# Patient Record
Sex: FEMALE | Race: WHITE | NOT HISPANIC OR LATINO | Employment: FULL TIME | URBAN - METROPOLITAN AREA
[De-identification: names, ages, dates, MRNs, and addresses within clinical notes are randomized per-mention and may not be internally consistent; named-entity substitution may affect disease eponyms.]

---

## 2024-07-31 ENCOUNTER — LAB REQUISITION (OUTPATIENT)
Dept: LAB | Facility: HOSPITAL | Age: 29
End: 2024-07-31
Payer: COMMERCIAL

## 2024-07-31 DIAGNOSIS — R39.9 UNSPECIFIED SYMPTOMS AND SIGNS INVOLVING THE GENITOURINARY SYSTEM: ICD-10-CM

## 2024-07-31 PROCEDURE — 88175 CYTOPATH C/V AUTO FLUID REDO: CPT

## 2024-07-31 PROCEDURE — 87086 URINE CULTURE/COLONY COUNT: CPT

## 2024-08-01 ENCOUNTER — APPOINTMENT (OUTPATIENT)
Dept: RADIOLOGY | Facility: HOSPITAL | Age: 29
End: 2024-08-01
Payer: COMMERCIAL

## 2024-08-01 ENCOUNTER — LAB REQUISITION (OUTPATIENT)
Dept: LAB | Facility: HOSPITAL | Age: 29
End: 2024-08-01
Payer: COMMERCIAL

## 2024-08-01 ENCOUNTER — HOSPITAL ENCOUNTER (EMERGENCY)
Facility: HOSPITAL | Age: 29
Discharge: HOME | End: 2024-08-01
Payer: COMMERCIAL

## 2024-08-01 VITALS
RESPIRATION RATE: 18 BRPM | OXYGEN SATURATION: 99 % | HEIGHT: 69 IN | TEMPERATURE: 98.2 F | BODY MASS INDEX: 24.52 KG/M2 | HEART RATE: 101 BPM | SYSTOLIC BLOOD PRESSURE: 171 MMHG | WEIGHT: 165.57 LBS | DIASTOLIC BLOOD PRESSURE: 77 MMHG

## 2024-08-01 DIAGNOSIS — Z01.419 ENCOUNTER FOR GYNECOLOGICAL EXAMINATION (GENERAL) (ROUTINE) WITHOUT ABNORMAL FINDINGS: ICD-10-CM

## 2024-08-01 DIAGNOSIS — Z11.51 ENCOUNTER FOR SCREENING FOR HUMAN PAPILLOMAVIRUS (HPV): ICD-10-CM

## 2024-08-01 DIAGNOSIS — R10.2 PELVIC PAIN: Primary | ICD-10-CM

## 2024-08-01 LAB
APPEARANCE UR: CLEAR
BACTERIA #/AREA URNS AUTO: ABNORMAL /HPF
BILIRUB UR STRIP.AUTO-MCNC: NEGATIVE MG/DL
COLOR UR: COLORLESS
GLUCOSE UR STRIP.AUTO-MCNC: NORMAL MG/DL
HCG UR QL IA.RAPID: NEGATIVE
KETONES UR STRIP.AUTO-MCNC: NEGATIVE MG/DL
LEUKOCYTE ESTERASE UR QL STRIP.AUTO: NEGATIVE
NITRITE UR QL STRIP.AUTO: NEGATIVE
PH UR STRIP.AUTO: 5.5 [PH]
PROT UR STRIP.AUTO-MCNC: NEGATIVE MG/DL
RBC # UR STRIP.AUTO: ABNORMAL /UL
RBC #/AREA URNS AUTO: ABNORMAL /HPF
SP GR UR STRIP.AUTO: 1.01
UROBILINOGEN UR STRIP.AUTO-MCNC: NORMAL MG/DL
WBC #/AREA URNS AUTO: ABNORMAL /HPF

## 2024-08-01 PROCEDURE — 76856 US EXAM PELVIC COMPLETE: CPT | Performed by: RADIOLOGY

## 2024-08-01 PROCEDURE — 96372 THER/PROPH/DIAG INJ SC/IM: CPT | Performed by: NURSE PRACTITIONER

## 2024-08-01 PROCEDURE — 2500000001 HC RX 250 WO HCPCS SELF ADMINISTERED DRUGS (ALT 637 FOR MEDICARE OP): Performed by: NURSE PRACTITIONER

## 2024-08-01 PROCEDURE — 81025 URINE PREGNANCY TEST: CPT | Performed by: NURSE PRACTITIONER

## 2024-08-01 PROCEDURE — 81003 URINALYSIS AUTO W/O SCOPE: CPT | Performed by: NURSE PRACTITIONER

## 2024-08-01 PROCEDURE — 76830 TRANSVAGINAL US NON-OB: CPT | Performed by: RADIOLOGY

## 2024-08-01 PROCEDURE — 99284 EMERGENCY DEPT VISIT MOD MDM: CPT | Mod: 25

## 2024-08-01 PROCEDURE — 2500000004 HC RX 250 GENERAL PHARMACY W/ HCPCS (ALT 636 FOR OP/ED): Performed by: NURSE PRACTITIONER

## 2024-08-01 PROCEDURE — 76856 US EXAM PELVIC COMPLETE: CPT

## 2024-08-01 RX ORDER — OXYCODONE AND ACETAMINOPHEN 5; 325 MG/1; MG/1
1 TABLET ORAL ONCE
Status: COMPLETED | OUTPATIENT
Start: 2024-08-01 | End: 2024-08-01

## 2024-08-01 RX ORDER — OXYCODONE AND ACETAMINOPHEN 5; 325 MG/1; MG/1
1 TABLET ORAL EVERY 6 HOURS PRN
Qty: 8 TABLET | Refills: 0 | Status: SHIPPED | OUTPATIENT
Start: 2024-08-01 | End: 2024-08-03

## 2024-08-01 RX ORDER — METHOCARBAMOL 500 MG/1
500 TABLET, FILM COATED ORAL 3 TIMES DAILY
Qty: 15 TABLET | Refills: 0 | Status: SHIPPED | OUTPATIENT
Start: 2024-08-01

## 2024-08-01 RX ORDER — KETOROLAC TROMETHAMINE 30 MG/ML
30 INJECTION, SOLUTION INTRAMUSCULAR; INTRAVENOUS ONCE
Status: COMPLETED | OUTPATIENT
Start: 2024-08-01 | End: 2024-08-01

## 2024-08-01 RX ORDER — METHOCARBAMOL 500 MG/1
500 TABLET, FILM COATED ORAL ONCE
Status: COMPLETED | OUTPATIENT
Start: 2024-08-01 | End: 2024-08-01

## 2024-08-01 ASSESSMENT — PAIN DESCRIPTION - PAIN TYPE: TYPE: ACUTE PAIN

## 2024-08-01 ASSESSMENT — PAIN SCALES - GENERAL
PAINLEVEL_OUTOF10: 10 - WORST POSSIBLE PAIN
PAINLEVEL_OUTOF10: 9
PAINLEVEL_OUTOF10: 10 - WORST POSSIBLE PAIN
PAINLEVEL_OUTOF10: 10 - WORST POSSIBLE PAIN

## 2024-08-01 ASSESSMENT — PAIN - FUNCTIONAL ASSESSMENT
PAIN_FUNCTIONAL_ASSESSMENT: 0-10
PAIN_FUNCTIONAL_ASSESSMENT: 0-10

## 2024-08-01 ASSESSMENT — PAIN DESCRIPTION - ORIENTATION: ORIENTATION: RIGHT;LEFT;LOWER

## 2024-08-01 ASSESSMENT — COLUMBIA-SUICIDE SEVERITY RATING SCALE - C-SSRS
6. HAVE YOU EVER DONE ANYTHING, STARTED TO DO ANYTHING, OR PREPARED TO DO ANYTHING TO END YOUR LIFE?: NO
2. HAVE YOU ACTUALLY HAD ANY THOUGHTS OF KILLING YOURSELF?: NO
1. IN THE PAST MONTH, HAVE YOU WISHED YOU WERE DEAD OR WISHED YOU COULD GO TO SLEEP AND NOT WAKE UP?: NO

## 2024-08-01 ASSESSMENT — PAIN DESCRIPTION - FREQUENCY: FREQUENCY: CONSTANT/CONTINUOUS

## 2024-08-01 ASSESSMENT — PAIN DESCRIPTION - LOCATION: LOCATION: ABDOMEN

## 2024-08-01 ASSESSMENT — PAIN DESCRIPTION - DESCRIPTORS
DESCRIPTORS: SHARP;DULL
DESCRIPTORS: PENETRATING

## 2024-08-01 ASSESSMENT — PAIN DESCRIPTION - PROGRESSION: CLINICAL_PROGRESSION: NOT CHANGED

## 2024-08-01 ASSESSMENT — PAIN DESCRIPTION - ONSET: ONSET: SUDDEN

## 2024-08-01 NOTE — ED PROVIDER NOTES
HPI   Chief Complaint   Patient presents with    Hip Pain     Since 1000 yesterday after my pelvic exam I have had sharp dull pain with spotting the pain goes into my lower back        HPI  See my MDM      Patient History   No past medical history on file.  No past surgical history on file.  No family history on file.  Social History     Tobacco Use    Smoking status: Not on file    Smokeless tobacco: Not on file   Substance Use Topics    Alcohol use: Not on file    Drug use: Not on file       Physical Exam   ED Triage Vitals [08/01/24 1706]   Temperature Heart Rate Respirations BP   36.8 °C (98.2 °F) (!) 101 18 171/77      Pulse Ox Temp Source Heart Rate Source Patient Position   99 % Oral Monitor Sitting      BP Location FiO2 (%)     Left arm --       Physical Exam  CONSTITUTIONAL: Vital signs reviewed as charted, well-developed and in no distress  Eyes: Extraocular muscles are intact. Pupils equal round and reactive to light. Conjunctiva are pink.    ENT: Mucous membranes are moist. Tongue in the midline. Pharynx was without erythema or exudates, uvula midline  LUNGS: Breath sounds equal and clear to auscultation. Good air exchange, no wheezes rales or retractions, pulse oximetry is charted.  HEART: Regular rate and rhythm without murmur thrill or rub, strong tones, auscultation is normal.  ABDOMEN: Soft and nontender without guarding rebound rigidity or mass. Bowel sounds are present and normal in all quadrants. There is no palpable masses or aneurysms identified. No hepatosplenomegaly, normal abdominal exam.  Neuro: The patient is awake, alert and oriented ×3. Moving all 4 extremities and answering questions appropriately.   MUSCULOSKELETAL: The calves are nontender to palpation. Full gross active range of motion.   PSYCH: Awake alert oriented, normal mood and affect.  Skin:  Dry, normal color, warm to the touch, no rash present.        ED Course & MDM   Diagnoses as of 08/01/24 1910   Pelvic pain              "          [place] Coma Scale Score: 15                        Medical Decision Making  History obtained from: patient    Vital signs, nursing notes, current medications, past medical history, Surgical history, allergies, social history, family History were reviewed.         HPI:  Patient 28-year-old female history of PID present emergency room today complaining of pelvic pain.  States she been having chronic pain for the last 5 or 6 months saw a new GYN yesterday had a pelvic exam with Pap and has been having excruciating sharp pains ever since.  States has been having some spotting as well.  States she called GYN and they told her to come here to the ER.  She states she has tried Excedrin.  Does not take anti-inflammatories as a mess with her stomach.  Denies dizziness, chest pain, shortness of breath or lower extremity edema.  Will obtain ultrasound, patient is asked not to do the transvaginal portion.      10 point ROS was reviewed and negative except Noted above in HPI.  DDX: as listed above          MDM Summary/considerations:  EMERGENCY DEPARTMENT COURSE and DIFFERENTIAL DIAGNOSIS/MDM:    The patient presented with a chief complaint of pelvic pain. The differential diagnosis associated with this patient's presentation includes PID, dysmenorrhea, musculoskeletal pain.     Vitals:    Vitals:    08/01/24 1706 08/01/24 1712   BP: 171/77    BP Location: Left arm    Patient Position: Sitting    Pulse: (!) 101    Resp: 18    Temp: 36.8 °C (98.2 °F)    TempSrc: Oral    SpO2: 99% 99%   Weight: 75.1 kg (165 lb 9.1 oz)    Height: 1.753 m (5' 9\")        Diagnoses as of 08/01/24 1910   Pelvic pain       History Limited by:    None    Independent history obtained from:    None    External records reviewed:    None    Diagnostics interpreted by me:    Ultrasound(s) pelvis    Discussions with other clinicians:    None    Chronic conditions impacting care:    None    Social determinants of health affecting " care:    None    Diagnostic tests considered but not performed: none    ED Medications managed:    Medications   ketorolac (Toradol) injection 30 mg (30 mg intramuscular Given 8/1/24 1729)   oxyCODONE-acetaminophen (Percocet) 5-325 mg per tablet 1 tablet (1 tablet oral Given 8/1/24 1846)   methocarbamol (Robaxin) tablet 500 mg (500 mg oral Given 8/1/24 1909)       Prescription drugs considered:     Oxycodone, Robaxin    Screenings:          Labs Reviewed   URINALYSIS WITH REFLEX CULTURE AND MICROSCOPIC - Abnormal       Result Value    Color, Urine Colorless (*)     Appearance, Urine Clear      Specific Gravity, Urine 1.006      pH, Urine 5.5      Protein, Urine NEGATIVE      Glucose, Urine Normal      Blood, Urine 0.03 (TRACE) (*)     Ketones, Urine NEGATIVE      Bilirubin, Urine NEGATIVE      Urobilinogen, Urine Normal      Nitrite, Urine NEGATIVE      Leukocyte Esterase, Urine NEGATIVE     URINALYSIS MICROSCOPIC WITH REFLEX CULTURE - Abnormal    WBC, Urine 1-5      RBC, Urine NONE      Bacteria, Urine 1+ (*)    HCG, URINE, QUALITATIVE - Normal    HCG, Urine NEGATIVE     URINALYSIS WITH REFLEX CULTURE AND MICROSCOPIC    Narrative:     The following orders were created for panel order Urinalysis with Reflex Culture and Microscopic.  Procedure                               Abnormality         Status                     ---------                               -----------         ------                     Urinalysis with Reflex C...[[id number]]  Abnormal            Final result               Extra Urine Gray Tube[[id number]]                                                         Please view results for these tests on the individual orders.   EXTRA URINE GRAY TUBE     US PELVIS TRANSABDOMINAL WITH TRANSVAGINAL   Final Result   Limited examination secondary to shadowing from overlying bowel gas.        1.6 cm uterine fibroid.        Limited visualization of the right ovary and nonvisualization of the   left ovary  secondary to shadowing from overlying bowel gas.        No significant free pelvic fluid.        Interpretation of the examination assumes negative pregnancy test;   please clinically correlate.        MACRO:   None        Signed by: Alfredito Barr 8/1/2024 6:36 PM   Dictation workstation:   [id number]        Medications   ketorolac (Toradol) injection 30 mg (30 mg intramuscular Given 8/1/24 1729)   oxyCODONE-acetaminophen (Percocet) 5-325 mg per tablet 1 tablet (1 tablet oral Given 8/1/24 1846)   methocarbamol (Robaxin) tablet 500 mg (500 mg oral Given 8/1/24 1909)     New Prescriptions    METHOCARBAMOL (ROBAXIN) 500 MG TABLET    Take 1 tablet (500 mg) by mouth 3 times a day.    OXYCODONE-ACETAMINOPHEN (PERCOCET) 5-325 MG TABLET    Take 1 tablet by mouth every 6 hours if needed for severe pain (7 - 10) for up to 2 days.     I estimate there is a low risk for acute appendicitis, polyps correction, cholecystitis, diverticulitis, incarcerated hernia, mesenteric ischemia, pancreatitis, perforated bowel or ulcer, ectopic pregnancy, or ovarian abscess, thus I consider the discharge disposition reasonable. Also there is no evidence of peritonitis, sepsis, or toxicity. We have discussed the diagnosis and risks, and we agree with discharging home to follow-up with her primary care doctor. We have discussed returning to the emergency department immediately if new or worsening symptoms occur. We have discussed the symptoms which are most concerning such as bloody stool, fever, changing or worsening pain, or vomiting necessitates immediate return.    Patient is feeling a little better after medications here in the ED urinalysis grossly unremarkable ultrasound shows small uterine fibroid, there is overlying bowel gas.  The right ovary is limited in the left is not visualized.  Patient with negative pregnancy test.  Will follow-up with her OB tomorrow for reevaluation.  Discharged home stable condition.    All of the patient's  questions were answered to the best of my ability.  Patient states understanding that they have been screened for an emergency today and we have not found any etiology of symptoms that requires emergent treatment or admission to the hospital at this point. They understand that they have not had definitive care day and require follow-up for treatment of their condition. They also state understanding that they may have an emergent condition that may potentially have not of detected at this visit and they must return to the emergency department if they develop any worsening of symptoms or new complaints.      Discussed H&P with supervising physician, aware of results and agrees with plan/ disposition.        Critical Care: Not warranted at this time        This chart was completed using voice recognition transcription software. Please excuse any errors of transcription including grammatical, punctuation, syntax and spelling errors.  Please contact me with any questions regarding this chart.    Procedure  Procedures     JONNY Tucker-CNP  08/01/24 1910

## 2024-08-02 LAB — BACTERIA UR CULT: NO GROWTH

## 2024-08-13 ENCOUNTER — APPOINTMENT (OUTPATIENT)
Dept: PREADMISSION TESTING | Facility: HOSPITAL | Age: 29
End: 2024-08-13
Payer: COMMERCIAL

## 2024-08-14 LAB
CYTOLOGY CMNT CVX/VAG CYTO-IMP: NORMAL
LAB AP HPV GENOTYPE QUESTION: YES
LAB AP HPV HR: NORMAL
LABORATORY COMMENT REPORT: NORMAL
LMP START DATE: NORMAL
PATH REPORT.TOTAL CANCER: NORMAL

## 2024-08-16 ENCOUNTER — LAB (OUTPATIENT)
Dept: LAB | Facility: LAB | Age: 29
End: 2024-08-16
Payer: COMMERCIAL

## 2024-08-16 ENCOUNTER — PRE-ADMISSION TESTING (OUTPATIENT)
Dept: PREADMISSION TESTING | Facility: HOSPITAL | Age: 29
End: 2024-08-16
Payer: COMMERCIAL

## 2024-08-16 VITALS
WEIGHT: 165 LBS | DIASTOLIC BLOOD PRESSURE: 74 MMHG | OXYGEN SATURATION: 100 % | HEIGHT: 69 IN | HEART RATE: 74 BPM | TEMPERATURE: 98.8 F | BODY MASS INDEX: 24.44 KG/M2 | SYSTOLIC BLOOD PRESSURE: 137 MMHG | RESPIRATION RATE: 16 BRPM

## 2024-08-16 DIAGNOSIS — Z01.818 PREOP TESTING: Primary | ICD-10-CM

## 2024-08-16 DIAGNOSIS — Z01.818 PREOP TESTING: ICD-10-CM

## 2024-08-16 LAB
APTT PPP: 28 SECONDS (ref 27–38)
INR PPP: 1.3 (ref 0.9–1.1)
PROTHROMBIN TIME: 14.2 SECONDS (ref 9.8–12.8)

## 2024-08-16 PROCEDURE — 36415 COLL VENOUS BLD VENIPUNCTURE: CPT

## 2024-08-16 PROCEDURE — 85610 PROTHROMBIN TIME: CPT

## 2024-08-16 PROCEDURE — 85730 THROMBOPLASTIN TIME PARTIAL: CPT

## 2024-08-16 RX ORDER — GABAPENTIN 100 MG/1
1 CAPSULE ORAL
COMMUNITY
Start: 2024-08-05

## 2024-08-16 RX ORDER — ALBUTEROL SULFATE 90 UG/1
2 INHALANT RESPIRATORY (INHALATION)
COMMUNITY
Start: 2024-01-31 | End: 2025-01-30

## 2024-08-16 RX ORDER — NORETHINDRONE ACETATE AND ETHINYL ESTRADIOL, ETHINYL ESTRADIOL AND FERROUS FUMARATE 1MG-10(24)
1 KIT ORAL
COMMUNITY
Start: 2024-07-31

## 2024-08-16 RX ORDER — ONDANSETRON 4 MG/1
TABLET, FILM COATED ORAL
COMMUNITY
Start: 2024-08-05

## 2024-08-16 ASSESSMENT — ENCOUNTER SYMPTOMS
HEMATOLOGIC/LYMPHATIC NEGATIVE: 1
CONSTITUTIONAL NEGATIVE: 1
EYES NEGATIVE: 1
ALLERGIC/IMMUNOLOGIC NEGATIVE: 1
ENDOCRINE NEGATIVE: 1
CARDIOVASCULAR NEGATIVE: 1
RESPIRATORY NEGATIVE: 1
MUSCULOSKELETAL NEGATIVE: 1
GASTROINTESTINAL NEGATIVE: 1
PSYCHIATRIC NEGATIVE: 1
NEUROLOGICAL NEGATIVE: 1

## 2024-08-16 ASSESSMENT — DUKE ACTIVITY SCORE INDEX (DASI)
CAN YOU CLIMB A FLIGHT OF STAIRS OR WALK UP A HILL: YES
TOTAL_SCORE: 58.2
CAN YOU WALK INDOORS, SUCH AS AROUND YOUR HOUSE: YES
CAN YOU DO MODERATE WORK AROUND THE HOUSE LIKE VACUUMING, SWEEPING FLOORS OR CARRYING GROCERIES: YES
CAN YOU PARTICIPATE IN STRENOUS SPORTS LIKE SWIMMING, SINGLES TENNIS, FOOTBALL, BASKETBALL, OR SKIING: YES
DASI METS SCORE: 9.9
CAN YOU WALK A BLOCK OR TWO ON LEVEL GROUND: YES
CAN YOU PARTICIPATE IN MODERATE RECREATIONAL ACTIVITIES LIKE GOLF, BOWLING, DANCING, DOUBLES TENNIS OR THROWING A BASEBALL OR FOOTBALL: YES
CAN YOU HAVE SEXUAL RELATIONS: YES
CAN YOU DO YARD WORK LIKE RAKING LEAVES, WEEDING OR PUSHING A MOWER: YES
CAN YOU DO HEAVY WORK AROUND THE HOUSE LIKE SCRUBBING FLOORS OR LIFTING AND MOVING HEAVY FURNITURE: YES
CAN YOU TAKE CARE OF YOURSELF (EAT, DRESS, BATHE, OR USE TOILET): YES
CAN YOU DO LIGHT WORK AROUND THE HOUSE LIKE DUSTING OR WASHING DISHES: YES
CAN YOU RUN A SHORT DISTANCE: YES

## 2024-08-16 NOTE — CPM/PAT H&P
CPM/PAT Evaluation       Name: Alisia Rodriguez (Alisia Rodriguez)  /Age: 1995/28 y.o.     In-Person       Chief Complaint: pelvic pain     HPI    Pt is a 28 year old female with pelvic pain. Pt reports about 5 months ago she developed sudden severe pelvic pain. Pt describes the pelvic pain as a constant severe pain that is decreasing her quality of life. Pt has had ultrasounds of her pelvis and a MRI of her pelvis. Per pt her OBGYN recommended a laparoscopy to find out more information about why she is having pain. Pt has been scheduled for a diagnostic laparoscopy. Pt denies CP, SOB, or dizziness.     Past Medical History:   Diagnosis Date    Asthma ([facility])     Factor VII deficiency (Multi)     GERD (gastroesophageal reflux disease)     IBS (irritable bowel syndrome)     Migraines     Ovarian cyst     right ovarian cyst    Pelvic pain     PONV (postoperative nausea and vomiting)     PUD (peptic ulcer disease)     Uterine fibroid      Past Surgical History:   Procedure Laterality Date    ADENOIDECTOMY      TONSILLECTOMY       Social History     Tobacco Use    Smoking status: Never    Smokeless tobacco: Never   Substance Use Topics    Alcohol use: Never     Social History     Substance and Sexual Activity   Drug Use Never     Allergies   Allergen Reactions    Augmentin [Amoxicillin-Pot Clavulanate] Hives     Current Outpatient Medications   Medication Sig Dispense Refill    albuterol 90 mcg/actuation inhaler Inhale 2 puffs.      gabapentin (Neurontin) 100 mg capsule Take 1 capsule (100 mg) by mouth 3 times a day.      Lo Loestrin Fe 1 mg-10 mcg (24)/10 mcg (2) tablet Take 1 tablet by mouth early in the morning..      ondansetron (Zofran) 4 mg tablet 1 tablet Orally every 8 hours PRN pain for 30 day(s)      aspirin-acetaminophen-caffeine (Excedrin Migraine) 250-250-65 mg tablet Take 1 tablet by mouth every 6 hours if needed for headaches.      methocarbamol (Robaxin) 500 mg tablet Take 1 tablet (500 mg)  "by mouth 3 times a day. 15 tablet 0     No current facility-administered medications for this visit.     Review of Systems   Constitutional: Negative.    HENT: Negative.     Eyes: Negative.    Respiratory: Negative.     Cardiovascular: Negative.    Gastrointestinal: Negative.    Endocrine: Negative.    Genitourinary:  Positive for pelvic pain.   Musculoskeletal: Negative.    Skin: Negative.    Allergic/Immunologic: Negative.    Neurological: Negative.    Hematological: Negative.    Psychiatric/Behavioral: Negative.       /74   Pulse 74   Temp 37.1 °C (98.8 °F) (Temporal)   Resp 16   Ht 1.753 m (5' 9\")   Wt 74.8 kg (165 lb)   LMP 07/04/2024   SpO2 100%   BMI 24.37 kg/m²     Physical Exam  Vitals reviewed.   Constitutional:       Appearance: Normal appearance. She is normal weight.   HENT:      Head: Normocephalic and atraumatic.      Nose: Nose normal.      Mouth/Throat:      Mouth: Mucous membranes are moist.      Pharynx: Oropharynx is clear.   Eyes:      Extraocular Movements: Extraocular movements intact.      Conjunctiva/sclera: Conjunctivae normal.      Pupils: Pupils are equal, round, and reactive to light.   Cardiovascular:      Rate and Rhythm: Normal rate and regular rhythm.      Pulses: Normal pulses.      Heart sounds: Normal heart sounds.   Pulmonary:      Effort: Pulmonary effort is normal.      Breath sounds: Normal breath sounds.   Abdominal:      General: Bowel sounds are normal.      Palpations: Abdomen is soft.      Tenderness: There is abdominal tenderness (abdominal pain with palpation).   Musculoskeletal:         General: Normal range of motion.      Cervical back: Normal range of motion and neck supple.   Skin:     General: Skin is warm and dry.   Neurological:      General: No focal deficit present.      Mental Status: She is alert and oriented to person, place, and time. Mental status is at baseline.   Psychiatric:         Mood and Affect: Mood normal.         Behavior: Behavior " "normal.         Thought Content: Thought content normal.         Judgment: Judgment normal.        PAT AIRWAY:   Airway:     Mallampati::  II    TM distance::  >3 FB    Neck ROM::  Full  normal      ASA: 2  DASI: 58.2  METS: 9.9  CHADS: 1.9%  RCRI: 0.4%  STOP BAN    Assessment and Plan:     Pelvic pain in female: diagnostic laparoscopy  Asthma: albuterol use with illness.   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 Pt stated \"my blood takes longer to clot\".   IBS      CBC and CMP completed on 2024.  Coag screen collected in PAT.     Manda Cuevas, APRN-CNP      "

## 2024-08-16 NOTE — H&P (VIEW-ONLY)
CPM/PAT Evaluation       Name: Alisia Rodriguez (Alisia Rodriguez)  /Age: 1995/28 y.o.     In-Person       Chief Complaint: pelvic pain     HPI    Pt is a 28 year old female with pelvic pain. Pt reports about 5 months ago she developed sudden severe pelvic pain. Pt describes the pelvic pain as a constant severe pain that is decreasing her quality of life. Pt has had ultrasounds of her pelvis and a MRI of her pelvis. Per pt her OBGYN recommended a laparoscopy to find out more information about why she is having pain. Pt has been scheduled for a diagnostic laparoscopy. Pt denies CP, SOB, or dizziness.     Past Medical History:   Diagnosis Date    Asthma ([facility])     Factor VII deficiency (Multi)     GERD (gastroesophageal reflux disease)     IBS (irritable bowel syndrome)     Migraines     Ovarian cyst     right ovarian cyst    Pelvic pain     PONV (postoperative nausea and vomiting)     PUD (peptic ulcer disease)     Uterine fibroid      Past Surgical History:   Procedure Laterality Date    ADENOIDECTOMY      TONSILLECTOMY       Social History     Tobacco Use    Smoking status: Never    Smokeless tobacco: Never   Substance Use Topics    Alcohol use: Never     Social History     Substance and Sexual Activity   Drug Use Never     Allergies   Allergen Reactions    Augmentin [Amoxicillin-Pot Clavulanate] Hives     Current Outpatient Medications   Medication Sig Dispense Refill    albuterol 90 mcg/actuation inhaler Inhale 2 puffs.      gabapentin (Neurontin) 100 mg capsule Take 1 capsule (100 mg) by mouth 3 times a day.      Lo Loestrin Fe 1 mg-10 mcg (24)/10 mcg (2) tablet Take 1 tablet by mouth early in the morning..      ondansetron (Zofran) 4 mg tablet 1 tablet Orally every 8 hours PRN pain for 30 day(s)      aspirin-acetaminophen-caffeine (Excedrin Migraine) 250-250-65 mg tablet Take 1 tablet by mouth every 6 hours if needed for headaches.      methocarbamol (Robaxin) 500 mg tablet Take 1 tablet (500 mg)  "by mouth 3 times a day. 15 tablet 0     No current facility-administered medications for this visit.     Review of Systems   Constitutional: Negative.    HENT: Negative.     Eyes: Negative.    Respiratory: Negative.     Cardiovascular: Negative.    Gastrointestinal: Negative.    Endocrine: Negative.    Genitourinary:  Positive for pelvic pain.   Musculoskeletal: Negative.    Skin: Negative.    Allergic/Immunologic: Negative.    Neurological: Negative.    Hematological: Negative.    Psychiatric/Behavioral: Negative.       /74   Pulse 74   Temp 37.1 °C (98.8 °F) (Temporal)   Resp 16   Ht 1.753 m (5' 9\")   Wt 74.8 kg (165 lb)   LMP 07/04/2024   SpO2 100%   BMI 24.37 kg/m²     Physical Exam  Vitals reviewed.   Constitutional:       Appearance: Normal appearance. She is normal weight.   HENT:      Head: Normocephalic and atraumatic.      Nose: Nose normal.      Mouth/Throat:      Mouth: Mucous membranes are moist.      Pharynx: Oropharynx is clear.   Eyes:      Extraocular Movements: Extraocular movements intact.      Conjunctiva/sclera: Conjunctivae normal.      Pupils: Pupils are equal, round, and reactive to light.   Cardiovascular:      Rate and Rhythm: Normal rate and regular rhythm.      Pulses: Normal pulses.      Heart sounds: Normal heart sounds.   Pulmonary:      Effort: Pulmonary effort is normal.      Breath sounds: Normal breath sounds.   Abdominal:      General: Bowel sounds are normal.      Palpations: Abdomen is soft.      Tenderness: There is abdominal tenderness (abdominal pain with palpation).   Musculoskeletal:         General: Normal range of motion.      Cervical back: Normal range of motion and neck supple.   Skin:     General: Skin is warm and dry.   Neurological:      General: No focal deficit present.      Mental Status: She is alert and oriented to person, place, and time. Mental status is at baseline.   Psychiatric:         Mood and Affect: Mood normal.         Behavior: Behavior " "normal.         Thought Content: Thought content normal.         Judgment: Judgment normal.        PAT AIRWAY:   Airway:     Mallampati::  II    TM distance::  >3 FB    Neck ROM::  Full  normal      ASA: 2  DASI: 58.2  METS: 9.9  CHADS: 1.9%  RCRI: 0.4%  STOP BAN    Assessment and Plan:     Pelvic pain in female: diagnostic laparoscopy  Asthma: albuterol use with illness.   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 Pt stated \"my blood takes longer to clot\".   IBS      CBC and CMP completed on 2024.  Coag screen collected in PAT.     Manda Cuevas, APRN-CNP      "

## 2024-08-16 NOTE — PREPROCEDURE INSTRUCTIONS
Preoperative Fasting Guidelines    Why must I stop eating and drinking near surgery time?  With sedation, food or liquid in your stomach can enter your lungs causing serious complications  Increases nausea and vomiting    When do I need to stop eating and drinking before my surgery?  Do not eat any food after midnight the night before your surgery/procedure.  You may have up to 13.5 ounces of clear liquid until TWO hours before your instructed arrival time to the hospital.  This includes water, black tea/coffee, (no milk or cream) apple juice, and electrolyte drinks (Gatorade)  You may chew gum until TWO hours before your surgery/procedure    PAT DISCHARGE INSTRUCTIONS    Please call the Same Day Surgery (SDS) Department of the hospital where your procedure will be performed after 2:00 PM the day before your surgery. If you are scheduled on a Monday, or a Tuesday following a Monday holiday, you will need to call on the last business day prior to your surgery.      [place] [id number] [phone]  Second Floor      Please let your surgeon know if:      You develop any open sores, shingles, burning or painful urination as these may increase your risk of an infection.   You no longer wish to have the surgery.   Any other personal circumstances change that may lead to the need to cancel or defer this surgery-such as being sick or getting admitted to any hospital within one week of your planned procedure.    Your contact details change, such as a change of address or phone number.    Starting now:     Please DO NOT drink alcohol or smoke for 24 hours before surgery. It is well known that quitting smoking can make a huge difference to your health and recovery from surgery. The longer you abstain from smoking, the better your chances of a healthy recovery. If you need help with quitting, call 5-013-QUIT-NOW to be connected to a trained counselor who will  discuss the best methods to help you quit.     Before your surgery:    Please stop all supplements 7 days prior to surgery. Or as directed by your surgeon.   Please stop taking NSAID pain medicine such as Advil and Motrin 7 days before surgery.    If you develop any fever, cough, cold, rashes, cuts, scratches, scrapes, urinary symptoms or infection anywhere on your body (including teeth and gums) prior to surgery, please call your surgeon’s office as soon as possible. This may require treatment to reduce the chance of cancellation on the day of surgery.    The day before your surgery:   DIET- Please follow the diet instructions at the top of your packet.   Get a good night’s rest.  Use the special soap for bathing if you have been instructed to use one.    Scheduled surgery times may change and you will be notified if this occurs - please check your personal voicemail for any updates.     On the morning of surgery:   Wear comfortable, loose fitting clothes which open in the front. Please do not wear moisturizers, creams, lotions, makeup or perfume.    Please bring with you to surgery:   Photo ID and insurance card   Current list of medicines and allergies   Pacemaker/ Defibrillator/Heart stent cards   CPAP machine and mask    Slings/ splints/ crutches   A copy of your complete advanced directive/DHPOA.    Please do NOT bring with you to surgery:   All jewelry and valuables should be left at home.   Prosthetic devices such as contact lenses, hearing aids, dentures, eyelash extensions, hairpins and body piercings must be removed prior to going in to the surgical suite.    After outpatient surgery:   A responsible adult MUST accompany you at the time of discharge and stay with you for 24 hours after your surgery. You may NOT drive yourself home after surgery.    Do not drive, operate machinery, make critical decisions or do activities that require co-ordination or balance until after a night’s sleep.   Do not drink  alcoholic beverages for 24 hours.   Instructions for resuming your medications will be provided by your surgeon.    CALL YOUR DOCTOR AFTER SURGERY IF YOU HAVE:     Chills and/or a fever of 101° F or higher.    Redness, swelling, pus or drainage from your surgical wound or a bad smell from the wound.    Lightheadedness, fainting or confusion.    Persistent vomiting (throwing up) and are not able to eat or drink for 12 hours.    Three or more loose, watery bowel movements in 24 hours (diarrhea).   Difficulty or pain while urinating( after non-urological surgery)    Pain and swelling in your legs, especially if it is only on one side.    Difficulty breathing or are breathing faster than normal.    Any new concerning symptoms.         Medication List            Accurate as of August 16, 2024  4:14 PM. Always use your most recent med list.                albuterol 90 mcg/actuation inhaler  Notes to patient: Take as needed.     aspirin-acetaminophen-caffeine 250-250-65 mg tablet  Commonly known as: Excedrin Migraine  Medication Adjustments for Surgery: Stop 7 days before surgery     gabapentin 100 mg capsule  Commonly known as: Neurontin  Medication Adjustments for Surgery: Take morning of surgery with sip of water, no other fluids     Lo Loestrin Fe 1 mg-10 mcg (24)/10 mcg (2) tablet  Generic drug: norethindrone-e.estradioL-iron  Notes to patient: Take evening dose before surgery.     methocarbamol 500 mg tablet  Commonly known as: Robaxin  Take 1 tablet (500 mg) by mouth 3 times a day.  Medication Adjustments for Surgery: Take morning of surgery with sip of water, no other fluids     ondansetron 4 mg tablet  Commonly known as: Zofran  Notes to patient: Take as needed.

## 2024-08-21 RX ORDER — SODIUM CHLORIDE, SODIUM LACTATE, POTASSIUM CHLORIDE, CALCIUM CHLORIDE 600; 310; 30; 20 MG/100ML; MG/100ML; MG/100ML; MG/100ML
20 INJECTION, SOLUTION INTRAVENOUS CONTINUOUS
Status: CANCELLED | OUTPATIENT
Start: 2024-08-21

## 2024-08-22 ENCOUNTER — PHARMACY VISIT (OUTPATIENT)
Dept: PHARMACY | Facility: CLINIC | Age: 29
End: 2024-08-22
Payer: MEDICARE

## 2024-08-22 ENCOUNTER — ANESTHESIA (OUTPATIENT)
Dept: OPERATING ROOM | Facility: HOSPITAL | Age: 29
End: 2024-08-22
Payer: COMMERCIAL

## 2024-08-22 ENCOUNTER — ANESTHESIA EVENT (OUTPATIENT)
Dept: OPERATING ROOM | Facility: HOSPITAL | Age: 29
End: 2024-08-22
Payer: COMMERCIAL

## 2024-08-22 ENCOUNTER — HOSPITAL ENCOUNTER (OUTPATIENT)
Facility: HOSPITAL | Age: 29
Setting detail: OUTPATIENT SURGERY
Discharge: HOME | End: 2024-08-22
Attending: OBSTETRICS & GYNECOLOGY | Admitting: OBSTETRICS & GYNECOLOGY
Payer: COMMERCIAL

## 2024-08-22 VITALS
TEMPERATURE: 98.2 F | OXYGEN SATURATION: 100 % | WEIGHT: 165 LBS | DIASTOLIC BLOOD PRESSURE: 66 MMHG | HEIGHT: 69 IN | BODY MASS INDEX: 24.44 KG/M2 | HEART RATE: 63 BPM | SYSTOLIC BLOOD PRESSURE: 137 MMHG | RESPIRATION RATE: 17 BRPM

## 2024-08-22 DIAGNOSIS — R10.2 PELVIC PAIN: Primary | ICD-10-CM

## 2024-08-22 LAB — PREGNANCY TEST URINE, POC: NEGATIVE

## 2024-08-22 PROCEDURE — RXMED WILLOW AMBULATORY MEDICATION CHARGE

## 2024-08-22 PROCEDURE — A4649 SURGICAL SUPPLIES: HCPCS | Performed by: OBSTETRICS & GYNECOLOGY

## 2024-08-22 PROCEDURE — 3600000008 HC OR TIME - EACH INCREMENTAL 1 MINUTE - PROCEDURE LEVEL THREE: Performed by: OBSTETRICS & GYNECOLOGY

## 2024-08-22 PROCEDURE — 3700000002 HC GENERAL ANESTHESIA TIME - EACH INCREMENTAL 1 MINUTE: Performed by: OBSTETRICS & GYNECOLOGY

## 2024-08-22 PROCEDURE — 7100000010 HC PHASE TWO TIME - EACH INCREMENTAL 1 MINUTE: Performed by: OBSTETRICS & GYNECOLOGY

## 2024-08-22 PROCEDURE — 7100000001 HC RECOVERY ROOM TIME - INITIAL BASE CHARGE: Performed by: OBSTETRICS & GYNECOLOGY

## 2024-08-22 PROCEDURE — 7100000002 HC RECOVERY ROOM TIME - EACH INCREMENTAL 1 MINUTE: Performed by: OBSTETRICS & GYNECOLOGY

## 2024-08-22 PROCEDURE — 2500000004 HC RX 250 GENERAL PHARMACY W/ HCPCS (ALT 636 FOR OP/ED): Performed by: ANESTHESIOLOGY

## 2024-08-22 PROCEDURE — 2720000007 HC OR 272 NO HCPCS: Performed by: OBSTETRICS & GYNECOLOGY

## 2024-08-22 PROCEDURE — 81025 URINE PREGNANCY TEST: CPT | Performed by: ANESTHESIOLOGY

## 2024-08-22 PROCEDURE — 2500000005 HC RX 250 GENERAL PHARMACY W/O HCPCS: Performed by: ANESTHESIOLOGY

## 2024-08-22 PROCEDURE — 3700000001 HC GENERAL ANESTHESIA TIME - INITIAL BASE CHARGE: Performed by: OBSTETRICS & GYNECOLOGY

## 2024-08-22 PROCEDURE — 7100000009 HC PHASE TWO TIME - INITIAL BASE CHARGE: Performed by: OBSTETRICS & GYNECOLOGY

## 2024-08-22 PROCEDURE — 3600000003 HC OR TIME - INITIAL BASE CHARGE - PROCEDURE LEVEL THREE: Performed by: OBSTETRICS & GYNECOLOGY

## 2024-08-22 RX ORDER — KETOROLAC TROMETHAMINE 30 MG/ML
INJECTION, SOLUTION INTRAMUSCULAR; INTRAVENOUS AS NEEDED
Status: DISCONTINUED | OUTPATIENT
Start: 2024-08-22 | End: 2024-08-22

## 2024-08-22 RX ORDER — SODIUM CHLORIDE, SODIUM LACTATE, POTASSIUM CHLORIDE, CALCIUM CHLORIDE 600; 310; 30; 20 MG/100ML; MG/100ML; MG/100ML; MG/100ML
20 INJECTION, SOLUTION INTRAVENOUS CONTINUOUS
Status: DISCONTINUED | OUTPATIENT
Start: 2024-08-22 | End: 2024-08-22 | Stop reason: HOSPADM

## 2024-08-22 RX ORDER — ACETAMINOPHEN 500 MG
1000 TABLET ORAL EVERY 6 HOURS PRN
COMMUNITY
End: 2024-08-22 | Stop reason: HOSPADM

## 2024-08-22 RX ORDER — HYDROCODONE BITARTRATE AND ACETAMINOPHEN 5; 325 MG/1; MG/1
1 TABLET ORAL EVERY 6 HOURS PRN
Qty: 20 TABLET | Refills: 0 | Status: SHIPPED | OUTPATIENT
Start: 2024-08-22

## 2024-08-22 RX ORDER — HYDRALAZINE HYDROCHLORIDE 20 MG/ML
5 INJECTION INTRAMUSCULAR; INTRAVENOUS EVERY 30 MIN PRN
Status: DISCONTINUED | OUTPATIENT
Start: 2024-08-22 | End: 2024-08-22 | Stop reason: HOSPADM

## 2024-08-22 RX ORDER — MIDAZOLAM HYDROCHLORIDE 1 MG/ML
1 INJECTION, SOLUTION INTRAMUSCULAR; INTRAVENOUS ONCE AS NEEDED
Status: DISCONTINUED | OUTPATIENT
Start: 2024-08-22 | End: 2024-08-22 | Stop reason: HOSPADM

## 2024-08-22 RX ORDER — MEPERIDINE HYDROCHLORIDE 25 MG/ML
12.5 INJECTION INTRAMUSCULAR; INTRAVENOUS; SUBCUTANEOUS EVERY 10 MIN PRN
Status: DISCONTINUED | OUTPATIENT
Start: 2024-08-22 | End: 2024-08-22 | Stop reason: HOSPADM

## 2024-08-22 RX ORDER — ROCURONIUM BROMIDE 10 MG/ML
INJECTION, SOLUTION INTRAVENOUS AS NEEDED
Status: DISCONTINUED | OUTPATIENT
Start: 2024-08-22 | End: 2024-08-22

## 2024-08-22 RX ORDER — ONDANSETRON HYDROCHLORIDE 2 MG/ML
4 INJECTION, SOLUTION INTRAVENOUS ONCE AS NEEDED
Status: COMPLETED | OUTPATIENT
Start: 2024-08-22 | End: 2024-08-22

## 2024-08-22 RX ORDER — SODIUM CHLORIDE, SODIUM LACTATE, POTASSIUM CHLORIDE, CALCIUM CHLORIDE 600; 310; 30; 20 MG/100ML; MG/100ML; MG/100ML; MG/100ML
100 INJECTION, SOLUTION INTRAVENOUS CONTINUOUS
Status: DISCONTINUED | OUTPATIENT
Start: 2024-08-22 | End: 2024-08-22 | Stop reason: HOSPADM

## 2024-08-22 RX ORDER — LIDOCAINE HYDROCHLORIDE 10 MG/ML
0.1 INJECTION INFILTRATION; PERINEURAL ONCE
Status: DISCONTINUED | OUTPATIENT
Start: 2024-08-22 | End: 2024-08-22 | Stop reason: HOSPADM

## 2024-08-22 RX ORDER — SCOLOPAMINE TRANSDERMAL SYSTEM 1 MG/1
1 PATCH, EXTENDED RELEASE TRANSDERMAL ONCE
Status: DISCONTINUED | OUTPATIENT
Start: 2024-08-22 | End: 2024-08-22 | Stop reason: HOSPADM

## 2024-08-22 RX ORDER — ALBUTEROL SULFATE 0.83 MG/ML
2.5 SOLUTION RESPIRATORY (INHALATION) ONCE AS NEEDED
Status: DISCONTINUED | OUTPATIENT
Start: 2024-08-22 | End: 2024-08-22 | Stop reason: HOSPADM

## 2024-08-22 RX ORDER — FENTANYL CITRATE 50 UG/ML
50 INJECTION, SOLUTION INTRAMUSCULAR; INTRAVENOUS EVERY 5 MIN PRN
Status: DISCONTINUED | OUTPATIENT
Start: 2024-08-22 | End: 2024-08-22 | Stop reason: HOSPADM

## 2024-08-22 RX ORDER — MIDAZOLAM HYDROCHLORIDE 1 MG/ML
INJECTION, SOLUTION INTRAMUSCULAR; INTRAVENOUS AS NEEDED
Status: DISCONTINUED | OUTPATIENT
Start: 2024-08-22 | End: 2024-08-22

## 2024-08-22 RX ORDER — FENTANYL CITRATE 50 UG/ML
INJECTION, SOLUTION INTRAMUSCULAR; INTRAVENOUS AS NEEDED
Status: DISCONTINUED | OUTPATIENT
Start: 2024-08-22 | End: 2024-08-22

## 2024-08-22 RX ORDER — PROPOFOL 10 MG/ML
INJECTION, EMULSION INTRAVENOUS AS NEEDED
Status: DISCONTINUED | OUTPATIENT
Start: 2024-08-22 | End: 2024-08-22

## 2024-08-22 SDOH — HEALTH STABILITY: MENTAL HEALTH: CURRENT SMOKER: 0

## 2024-08-22 ASSESSMENT — PAIN SCALES - GENERAL
PAINLEVEL_OUTOF10: 5 - MODERATE PAIN
PAINLEVEL_OUTOF10: 5 - MODERATE PAIN
PAINLEVEL_OUTOF10: 4
PAINLEVEL_OUTOF10: 5 - MODERATE PAIN
PAIN_LEVEL: 2
PAINLEVEL_OUTOF10: 0 - NO PAIN
PAINLEVEL_OUTOF10: 6

## 2024-08-22 ASSESSMENT — PAIN - FUNCTIONAL ASSESSMENT
PAIN_FUNCTIONAL_ASSESSMENT: 0-10

## 2024-08-22 ASSESSMENT — COLUMBIA-SUICIDE SEVERITY RATING SCALE - C-SSRS
2. HAVE YOU ACTUALLY HAD ANY THOUGHTS OF KILLING YOURSELF?: NO
6. HAVE YOU EVER DONE ANYTHING, STARTED TO DO ANYTHING, OR PREPARED TO DO ANYTHING TO END YOUR LIFE?: NO
1. IN THE PAST MONTH, HAVE YOU WISHED YOU WERE DEAD OR WISHED YOU COULD GO TO SLEEP AND NOT WAKE UP?: NO

## 2024-08-22 ASSESSMENT — PAIN DESCRIPTION - DESCRIPTORS
DESCRIPTORS: SORE
DESCRIPTORS: ACHING;SHARP;SHOOTING
DESCRIPTORS: SORE
DESCRIPTORS: SORE

## 2024-08-22 ASSESSMENT — PAIN DESCRIPTION - LOCATION: LOCATION: ABDOMEN

## 2024-08-22 NOTE — ANESTHESIA PROCEDURE NOTES
Airway  Date/Time: 8/22/2024 8:03 AM  Urgency: elective    Airway not difficult    Staffing  Performed: attending   Authorized by: Dhaval Rodríguez MD    Performed by: Dhaval Rodríguez MD  Patient location during procedure: OR    Indications and Patient Condition  Indications for airway management: anesthesia  Spontaneous Ventilation: absent  Sedation level: deep  Preoxygenated: yes  Patient position: sniffing  MILS not maintained throughout  Mask difficulty assessment: 1 - vent by mask  Planned trial extubation    Final Airway Details  Final airway type: endotracheal airway      Successful airway: ETT  Cuffed: yes   Successful intubation technique: direct laryngoscopy  Endotracheal tube insertion site: oral  Blade: Nuria  Blade size: #3  ETT size (mm): 6.5  Cormack-Lehane Classification: grade I - full view of glottis  Placement verified by: chest auscultation and capnometry   Cuff volume (mL): 5  Measured from: lips  ETT to lips (cm): 22  Number of attempts at approach: 1

## 2024-08-22 NOTE — INTERVAL H&P NOTE
"Interval Obstetrical Admission History and Physical    Patient Description:  Alisia Rodriguez is a 28 y.o. with 6 months of daily pelvic pain. Some response with tylenol and gabapentin as well as a change to her birth control. Here for diagnostic laparoscopy to assess for endometriosis.    Laboratory: I have reviewed pertinent lab studies.     Physical Exam:  /75   Pulse 78   Temp 36.6 °C (97.9 °F) (Temporal)   Resp 18   Ht 1.753 m (5' 9\")   Wt 74.8 kg (165 lb)   BMI 24.37 kg/m²   NAD  Abd soft, ND, ND  Ext NT  Pelvic exam deferred to OR    There is no change in physical condition since the previously submitted Admission History and Physical Examination.    Plans:   Diagnostic laparoscopy, possible excision of endometriosis.   Risks, benefits discussed, Consent signed.  Expectations discussed.    All questions have been answered to the patient's satisfaction.     No guarantee was expressed or implied as to the results of the procedure.     "

## 2024-08-22 NOTE — ANESTHESIA PREPROCEDURE EVALUATION
Patient: Alisia Rodriguez    Procedure Information       Date/Time: 08/22/24 0730    Procedure: Diagnostic Laparoscopy (Bilateral)    [facility]: [facility] / [facility]    Surgeons: Alisia Villasenor MD            Relevant Problems   No relevant active problems     Hx asthma, no current rx.  Clinical information reviewed:   Tobacco  Allergies  Meds   Med Hx  Surg Hx  OB Status  Fam Hx  Soc   Hx        NPO Detail:  NPO/Void Status  Carbohydrate Drink Given Prior to Surgery? : N  Date of Last Liquid: 08/22/24  Time of Last Liquid: 0600  Date of Last Solid: 08/21/24  Time of Last Solid: 1930  Last Intake Type: Clear fluids  Time of Last Void: 0659         Physical Exam    Airway  Mallampati: II  TM distance: >3 FB  Neck ROM: full     Cardiovascular - normal exam     Dental - normal exam     Pulmonary - normal exam     Abdominal - normal exam             Anesthesia Plan    History of general anesthesia?: yes  History of complications of general anesthesia?: no    ASA 2     general     The patient is not a current smoker.  Patient was not previously instructed to abstain from smoking on day of procedure.  Patient did not smoke on day of procedure.  Education provided regarding risk of obstructive sleep apnea.  intravenous induction   Postoperative administration of opioids is intended.  Trial extubation is planned.  Anesthetic plan and risks discussed with patient.  Use of blood products discussed with patient who consented to blood products.    Plan discussed with CAA, attending and CRNA.

## 2024-08-22 NOTE — POST-PROCEDURE NOTE
1014- pt brought back from pacu, verbal report received. Pt is alert and awake. Snack and drink given. Rx to go called. Fiance brought back from waiting room.    1105- vss. Discharge instructions given and explained to pt and fiance. Both verbally understood. Pt tolerating snack and drink.     1115- pt ambulated to BR with steady gait and voided per order.     1123- pt getting dressed at this time with assistance of rosemarie.    1131- transport at bedside for discharge.

## 2024-08-22 NOTE — OP NOTE
Date: 2024  OR Location: [facility]    Name: Alisia Rodriguez, : 1995, Age: 28 y.o., MRN: [id number], Sex: female    Diagnosis  Pre-op Diagnosis      * Pelvic pain in female [R10.2] Post-op Diagnosis     * Pelvic pain in female [R10.2]     Procedures  Diagnostic Laparoscopy, LYSIS OF ADHESIONS  26613 - OH LAPS ABD PRTM&OMENTUM DX W/WO SPEC BR/WA SPX      Surgeons      * Alisia Villasenor - Primary    Resident/Fellow/Other Assistant:  Deedee    Procedure Summary  Anesthesia: General  ASA: II  Anesthesia Staff: Anesthesiologist: Dhaval Rodríguez MD  Estimated Blood Loss: 0 mL  Intra-op Medications:   Administrations occurring from 0730 to 0900 on 24:   Medication Name Total Dose   scopolamine (Transderm-Scop) patch 1 patch 1 patch              Anesthesia Record               Intraprocedure I/O Totals       None           Specimen: No specimens collected     Staff:   Circulator: Nicholas  Scrub Person: Oscar  Circulator: Иван         Procedure Details:  The patient was seen in the preoperative area. The site of surgery was properly noted/marked if necessary per policy. The patient has been actively warmed in preoperative area. Preoperative antibiotics are not indicated. Venous thrombosis prophylaxis have been ordered including bilateral sequential compression devices    The patient was taken to the operating room placed in the operating room table.  The surgical pause performed confirming both the patient and the procedure.  General anesthesia was administered and the patient was placed in the dorsolithotomy position.  The patient's abdomen, perineum, and vagina were prepped and draped in usual sterile fashion.  A Rodriguez catheter was inserted under sterile conditions.  Anterior and posterior tractors were inserted in the vagina and the anterior lip of the cervix was grasped with single-tooth tenaculum.  A Momperylka uterine manipulator was then placed without difficulty.  Remaining instruments removed from  the vagina.  Attention was then turned to the abdomen.  A 5 mm umbilical incision was made and the 5 mm optical port placed under direct visualization.  The abdomen is then insufflated under low flow with low pressures noted.  Thorough inspection of the pelvis was carried out with the listed findings noted.  At this time bilateral 5 mm right and left lower quadrant incisions were made and the respective ports placed without difficulty.  Patient was placed in steep Trendelenburg.  The adhesions in the right lower quadrant were lysed without difficulty.  Good hemostasis was noted throughout.  At this time the procedure was then concluded.  The abdomen is desufflated and the ports removed without difficulty.  Skin incisions were closed using interrupted sutures of 4-0 Monocryl.  Dermabond was used to seal the surgical incisions.  All instruments removed from the vagina and the anterior lip of the cervix was noted to be hemostatic.  The Rodriguez catheter was removed.  The patient was cleansed and dried and returned to the supine position.  She was reversed of anesthesia and taken to the postanesthesia care unit in satisfactory condition.  All sponge, needle, and instrument counts were correct x 2 at the end of the procedure.    Findings: The patient was noted to have a normal uterus, fallopian tubes, and ovaries bilaterally.  There was no evidence of ovarian cysts.  There was no evidence of endometriosis on thorough inspection of the pelvis.  Appendix was normal in appearance.  There was noted to be some filmy adhesions along the descending colon in the right lower quadrant which were lysed.    Complications:  None; patient tolerated the procedure well.     Disposition: PACU - hemodynamically stable.  Condition: stable

## 2024-08-22 NOTE — ANESTHESIA POSTPROCEDURE EVALUATION
Patient: Alisia Rodriguez    Procedure Summary       Date: 08/22/24 Room / Location: [facility] / [facility]    Anesthesia Start: 0751 Anesthesia Stop: 0858    Procedure: Diagnostic Laparoscopy, LYSIS OF ADHESIONS (Bilateral) Diagnosis:       Pelvic pain in female      (PELVIC PAIN)    Surgeons: Alisia Villasenor MD Responsible Provider: Dhaval Rodríguez MD    Anesthesia Type: general ASA Status: 2            Anesthesia Type: general    Vitals Value Taken Time   /72 08/22/24 1010   Temp 36.8 °C (98.2 °F) 08/22/24 0955   Pulse 59 08/22/24 1010   Resp 16 08/22/24 1010   SpO2 100 % 08/22/24 1012   Vitals shown include unfiled device data.    Anesthesia Post Evaluation    Patient location during evaluation: PACU  Patient participation: complete - patient participated  Level of consciousness: sleepy but conscious  Pain score: 2  Pain management: adequate  Multimodal analgesia pain management approach  Airway patency: patent  Cardiovascular status: acceptable  Respiratory status: acceptable  Hydration status: acceptable  Postoperative Nausea and Vomiting: none        No notable events documented.

## (undated) DEVICE — CATHETER TRAY, FOLEY, ALOETOUCH, 16FR, 10ML, W/ DRAINBAG

## (undated) DEVICE — ADHESIVE, SKIN, MASTISOL, 2/3 CC VIAL

## (undated) DEVICE — TROCAR, KII OPTICAL BLADELESS 5MM Z THREAD 100MM LNGTH

## (undated) DEVICE — Device

## (undated) DEVICE — SUTURE, MONOCRYL, 4-0, 27 IN, PS-2, UNDYED

## (undated) DEVICE — THUNDERBEAT, 5MM, 35CM, FRONT ACTUATED

## (undated) DEVICE — BANDAGE, ADHESIVE, STRIP, COMFORT CLOTH, 1 X 3 IN, LF

## (undated) DEVICE — SUTURE, VICRYL, 0, 27 IN, UR-6, VIOLET

## (undated) DEVICE — INSUFFLATION TUBING, HIGH FLOW, W/FILTER

## (undated) DEVICE — ADHESIVE, SKIN, DERMABOND ADVANCED, 15CM, PEN-STYLE

## (undated) DEVICE — GLOVE, SURGICAL, PROTEXIS PI , 6.5, PF, LF

## (undated) DEVICE — STRIP, SKIN CLOSURE, STERI STRIP, REINFORCED, 0.5 X 4 IN

## (undated) DEVICE — SPONGE, GAUZE, RADIOPAQUE, 12 PLY, 4 X 8 IN, STERILE, LF

## (undated) DEVICE — SYRINGE, 10 CC, LUER LOCK

## (undated) DEVICE — TUBING, SUCTION, 9 FT, STERILE, CLEAR

## (undated) DEVICE — NEEDLE, HYPODERMIC, PROEDGE, 22G X 1.5, BLACK

## (undated) DEVICE — SOLUTION, IRRIGATION, X RX SODIUM CHL 0.9%, 1000ML BTL

## (undated) DEVICE — SLEEVE, VASO PRESS, CALF GARMENT, MEDIUM, GREEN

## (undated) DEVICE — PREP TRAY, VAGINAL

## (undated) DEVICE — NEEDLE, INSUFFLATION, 13GAX120MM, DISP

## (undated) DEVICE — TUBE SET, PNEUMOCLEAR, SMOKE EVACU, HIGH-FLOW

## (undated) DEVICE — APPLICATOR, CHLORAPREP, W/ORANGE TINT, 26ML

## (undated) DEVICE — TIP, SUCTION, YANKAUER, FLEXIBLE

## (undated) DEVICE — SLEEVE, KII, Z-THREAD, 5X100CM